# Patient Record
Sex: FEMALE | Race: BLACK OR AFRICAN AMERICAN | URBAN - METROPOLITAN AREA
[De-identification: names, ages, dates, MRNs, and addresses within clinical notes are randomized per-mention and may not be internally consistent; named-entity substitution may affect disease eponyms.]

---

## 2019-07-19 ENCOUNTER — OFFICE VISIT (OUTPATIENT)
Dept: FAMILY MEDICINE CLINIC | Age: 24
End: 2019-07-19
Payer: COMMERCIAL

## 2019-07-19 VITALS
WEIGHT: 142 LBS | DIASTOLIC BLOOD PRESSURE: 70 MMHG | BODY MASS INDEX: 21.52 KG/M2 | HEIGHT: 68 IN | SYSTOLIC BLOOD PRESSURE: 103 MMHG | HEART RATE: 69 BPM

## 2019-07-19 DIAGNOSIS — Z00.00 WELL ADULT EXAM: Primary | ICD-10-CM

## 2019-07-19 DIAGNOSIS — Z13.220 SCREENING FOR HYPERLIPIDEMIA: ICD-10-CM

## 2019-07-19 DIAGNOSIS — E55.9 VITAMIN D DEFICIENCY: ICD-10-CM

## 2019-07-19 DIAGNOSIS — Z13.1 SCREENING FOR DIABETES MELLITUS: ICD-10-CM

## 2019-07-19 DIAGNOSIS — Z23 NEED FOR TDAP VACCINATION: ICD-10-CM

## 2019-07-19 PROCEDURE — 90471 IMMUNIZATION ADMIN: CPT | Performed by: FAMILY MEDICINE

## 2019-07-19 PROCEDURE — 99395 PREV VISIT EST AGE 18-39: CPT | Performed by: FAMILY MEDICINE

## 2019-07-19 PROCEDURE — 90715 TDAP VACCINE 7 YRS/> IM: CPT | Performed by: FAMILY MEDICINE

## 2019-07-19 RX ORDER — NORGESTIMATE AND ETHINYL ESTRADIOL 7DAYSX3 28
1 KIT ORAL DAILY
Qty: 28 TABLET | Refills: 3 | Status: SHIPPED | OUTPATIENT
Start: 2019-07-19 | End: 2019-10-07 | Stop reason: SDUPTHER

## 2019-07-19 ASSESSMENT — PATIENT HEALTH QUESTIONNAIRE - PHQ9
SUM OF ALL RESPONSES TO PHQ QUESTIONS 1-9: 0
SUM OF ALL RESPONSES TO PHQ9 QUESTIONS 1 & 2: 0
SUM OF ALL RESPONSES TO PHQ QUESTIONS 1-9: 0
2. FEELING DOWN, DEPRESSED OR HOPELESS: 0
1. LITTLE INTEREST OR PLEASURE IN DOING THINGS: 0

## 2019-10-07 ENCOUNTER — OFFICE VISIT (OUTPATIENT)
Dept: FAMILY MEDICINE CLINIC | Age: 24
End: 2019-10-07
Payer: COMMERCIAL

## 2019-10-07 VITALS
HEART RATE: 72 BPM | BODY MASS INDEX: 21.67 KG/M2 | SYSTOLIC BLOOD PRESSURE: 116 MMHG | WEIGHT: 143 LBS | HEIGHT: 68 IN | DIASTOLIC BLOOD PRESSURE: 71 MMHG

## 2019-10-07 DIAGNOSIS — Z11.3 SCREEN FOR STD (SEXUALLY TRANSMITTED DISEASE): ICD-10-CM

## 2019-10-07 DIAGNOSIS — Z01.419 ENCOUNTER FOR GYNECOLOGICAL EXAMINATION WITHOUT ABNORMAL FINDING: Primary | ICD-10-CM

## 2019-10-07 LAB
HBV SURFACE AB TITR SER: 10.45 MIU/ML
HEPATITIS B CORE IGM ANTIBODY: NORMAL
HEPATITIS B SURFACE ANTIGEN INTERPRETATION: NORMAL
HEPATITIS C ANTIBODY INTERPRETATION: NORMAL

## 2019-10-07 PROCEDURE — 36415 COLL VENOUS BLD VENIPUNCTURE: CPT | Performed by: FAMILY MEDICINE

## 2019-10-07 PROCEDURE — 99395 PREV VISIT EST AGE 18-39: CPT | Performed by: FAMILY MEDICINE

## 2019-10-07 RX ORDER — NORGESTIMATE AND ETHINYL ESTRADIOL 7DAYSX3 28
1 KIT ORAL DAILY
Qty: 28 TABLET | Refills: 11 | Status: SHIPPED | OUTPATIENT
Start: 2019-10-07 | End: 2019-10-23 | Stop reason: SDUPTHER

## 2019-10-08 LAB
CANDIDA SPECIES, DNA PROBE: NORMAL
GARDNERELLA VAGINALIS, DNA PROBE: NORMAL
HIV AG/AB: NORMAL
HIV ANTIGEN: NORMAL
HIV-1 ANTIBODY: NORMAL
HIV-2 AB: NORMAL
TOTAL SYPHILLIS IGG/IGM: NORMAL
TRICHOMONAS VAGINALIS DNA: NORMAL

## 2019-10-09 LAB
C TRACH DNA GENITAL QL NAA+PROBE: NEGATIVE
N. GONORRHOEAE DNA: NEGATIVE

## 2019-10-24 RX ORDER — NORGESTIMATE AND ETHINYL ESTRADIOL 7DAYSX3 28
1 KIT ORAL DAILY
Qty: 28 TABLET | Refills: 11 | Status: SHIPPED | OUTPATIENT
Start: 2019-10-24 | End: 2020-10-26 | Stop reason: SDUPTHER

## 2019-11-15 RX ORDER — NORGESTIMATE AND ETHINYL ESTRADIOL 7DAYSX3 28
1 KIT ORAL DAILY
Qty: 28 TABLET | Refills: 11 | OUTPATIENT
Start: 2019-11-15

## 2020-04-01 ENCOUNTER — PATIENT MESSAGE (OUTPATIENT)
Dept: FAMILY MEDICINE CLINIC | Age: 25
End: 2020-04-01

## 2020-10-26 RX ORDER — NORGESTIMATE AND ETHINYL ESTRADIOL 7DAYSX3 28
1 KIT ORAL DAILY
Qty: 28 TABLET | Refills: 11 | Status: SHIPPED | OUTPATIENT
Start: 2020-10-26

## 2021-03-15 ENCOUNTER — PATIENT MESSAGE (OUTPATIENT)
Dept: FAMILY MEDICINE CLINIC | Age: 26
End: 2021-03-15

## 2021-03-15 NOTE — TELEPHONE ENCOUNTER
From: Anjana Crespo  To: Mary Childress MD  Sent: 3/15/2021 4:26 PM EDT  Subject: Non-Urgent Medical Question    Hello,    My significant other has covid and I had to stay home to take care of them and quarantine. In addition, my job is requiring documentation from a doctor saying I would need to be self quarantine. Is it possible a document could be created?

## 2021-03-15 NOTE — LETTER
[address], [address] [id number]  Phone: [phone]  Fax: [phone]    Alfonso Harding CNP        March 16, 2021     Patient: Bonita Chappell   YOB: 1995   Date of Visit: 3/15/2021       To Whom It May Concern: It is my medical opinion that Bonita Chappell should remain out of work until 3/29 due to exposure to Covid. If you have any questions or concerns, please don't hesitate to call.     Sincerely,        Alfonso Harding CNP

## 2021-08-31 RX ORDER — NORGESTIMATE AND ETHINYL ESTRADIOL 7DAYSX3 28
KIT ORAL
Qty: 28 TABLET | Refills: 11 | OUTPATIENT
Start: 2021-08-31